# Patient Record
Sex: MALE | Race: WHITE
[De-identification: names, ages, dates, MRNs, and addresses within clinical notes are randomized per-mention and may not be internally consistent; named-entity substitution may affect disease eponyms.]

---

## 2016-11-03 VITALS
SYSTOLIC BLOOD PRESSURE: 204 MMHG | DIASTOLIC BLOOD PRESSURE: 96 MMHG | DIASTOLIC BLOOD PRESSURE: 96 MMHG | DIASTOLIC BLOOD PRESSURE: 96 MMHG | SYSTOLIC BLOOD PRESSURE: 204 MMHG | SYSTOLIC BLOOD PRESSURE: 204 MMHG | DIASTOLIC BLOOD PRESSURE: 96 MMHG | SYSTOLIC BLOOD PRESSURE: 204 MMHG

## 2017-06-06 ENCOUNTER — HOSPITAL ENCOUNTER (OUTPATIENT)
Dept: HOSPITAL 61 - KCIC CT | Age: 42
Discharge: HOME | End: 2017-06-06
Attending: NEUROLOGICAL SURGERY
Payer: MEDICARE

## 2017-06-06 DIAGNOSIS — R51: Primary | ICD-10-CM

## 2017-06-06 PROCEDURE — 70450 CT HEAD/BRAIN W/O DYE: CPT

## 2017-06-06 PROCEDURE — 74000: CPT

## 2017-06-06 PROCEDURE — 71020: CPT

## 2017-06-06 PROCEDURE — 70250 X-RAY EXAM OF SKULL: CPT

## 2017-06-06 NOTE — [facility]
AP and lateral skull

 

INDICATION: Headache, shunt series

 

FINDINGS:

 

There is a right transfrontal ventricular ostomy shunt catheter that 

terminates near midline. The extracranial portions demonstrate no evidence

for discontinuity or kinking. An old shunt catheter remnant is also noted 

in the soft tissues of the neck.

 

IMPRESSION:

 

No evidence for kinking or discontinuity of the ventriculostomy shunt 

catheter.

 

Electronically signed by: Scott Esqueda MD (6/6/2017 3:40 PM)

## 2017-06-06 NOTE — [facility]
ABDOMEN AP

 

Clinical Indication: Shunt series. Headache.

 

Comparison: None.

 

Findings: 

There is shunt catheter coursing in the right abdomen extending into the 

pelvis. Distal catheter is coiled on itself. There is a second catheter 

that is just to the right of midline and terminates in the upper abdomen 

projecting just lateral to L1/L2. No discontinuity of the catheters is 

seen.

 

No dilated loops of bowel are seen. The bowel gas pattern is 

nonobstructive. 

 

There is no acute bony abnormality.  

 

IMPRESSION:

Shunt catheters in the right abdomen and pelvis.

 

Electronically signed by: Baldev Seay MD (6/6/2017 3:43 PM)

## 2017-06-06 NOTE — [facility]
PQRS STATEMENT:

One or more of the following in the visualized dose reduction techniques 

were utilized for this study: 1. Automatic exposure control, 2. Adjustment

of the mA and/or kV according to patient size, 3. Use of iterative 

reconstruction technique 

 

CT HEAD

 

INDICATION: Hydrocephalus

 

COMPARISON: 4/20/2016 and 11/3/2015

 

TECHNIQUE: 5 mm contiguous axial images were obtained from the skull base 

to the vertex in both bone and soft tissue algorithm.  

 

FINDINGS: 

 

A right frontal ventricular catheter remains in stable positioning 

extending through the right frontal horn and foramen of Rai. The 

ventricles are unchanged in size and within normal limits. There are 

posterior changes of a a suboccipital craniectomy. There is no acute 

intracranial hemorrhage. No midline shift or mass effect. Basal cisterns 

are patent. Globes and orbits are within normal limits. Visualized 

paranasal sinuses and mastoid air cells are clear.

 

IMPRESSION:

 

No evidence for acute intracranial hemorrhage, mass effect, or 

hydrocephalus. Stable positioning of the right transforaminal 

ventriculostomy shunt catheter.

 

Electronically signed by: Scott Esqueda MD (6/6/2017 3:37 PM)

## 2017-06-06 NOTE — [facility]
Two view chest 

 

indication: Shunt series, headache

 

 

 

Findings:

 

There is a catheter overlying the right anterior chest wall shows no 

evidence for kinking or discontinuity. No shunt catheter is also noted 

which shows some surrounding calcification. Heart size is normal. Lungs 

are clear.

 

IMPRESSION:

 

No evidence for kinking or discontinuity of the ventriculostomy shunt 

catheter.

 

Electronically signed by: Scott Esqueda MD (6/6/2017 3:39 PM)

## 2017-08-22 ENCOUNTER — HOSPITAL ENCOUNTER (OUTPATIENT)
Dept: HOSPITAL 61 - KCIC CT | Age: 42
Discharge: HOME | End: 2017-08-22
Attending: NEUROLOGICAL SURGERY
Payer: MEDICARE

## 2017-08-22 DIAGNOSIS — G91.9: Primary | ICD-10-CM

## 2017-08-22 PROCEDURE — 70450 CT HEAD/BRAIN W/O DYE: CPT

## 2017-08-22 NOTE — [facility]
PQRS Compliance Statement:

 

One or more of the following individualized dose reduction techniques were

utilized for this examination:  

1. Automated exposure control  

2. Adjustment of the mA and/or kV according to patient size  

3. Use of iterative reconstruction technique

 

 

CT HEAD WITHOUT CONTRAST

 

History: Hydrocephalus follow-up. 

 

Comparison: CT head without contrast June 6, 2017.

 

Procedure: Axial images are obtained of the head from the skull base 

through the vertex without IV contrast.

 

Findings: 

Right frontal shunt catheter is stable. The ventricles are unchanged. 

 

No mass-effect, midline shift, hemorrhage, extra-axial fluid collection, 

or obvious acute infarction is identified.  Basilar cisterns are patent.  

 

Bone windows demonstrate no acute calvarial abnormality. Redemonstrated 

suboccipital craniectomy.

 

The visualized paranasal sinuses are clear. Mastoid air cells are well 

aerated. 

 

IMPRESSION:

1.  No acute intracranial abnormality. 

2.  Stable right frontal shunt catheter and ventricles.

 

Electronically signed by: Baldev Seay MD (8/22/2017 11:28 AM) [id number]

## 2018-03-22 ENCOUNTER — HOSPITAL ENCOUNTER (INPATIENT)
Dept: HOSPITAL 61 - 4 NORTH | Age: 43
LOS: 5 days | Discharge: HOME | DRG: 896 | End: 2018-03-27
Attending: FAMILY MEDICINE | Admitting: FAMILY MEDICINE
Payer: MEDICARE

## 2018-03-22 DIAGNOSIS — G93.41: ICD-10-CM

## 2018-03-22 DIAGNOSIS — Z86.011: ICD-10-CM

## 2018-03-22 DIAGNOSIS — E87.8: ICD-10-CM

## 2018-03-22 DIAGNOSIS — I25.10: ICD-10-CM
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
## 2018-03-22 DIAGNOSIS — Z83.3: ICD-10-CM
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
## 2018-03-22 DIAGNOSIS — N17.9: ICD-10-CM

## 2018-03-22 DIAGNOSIS — Z88.8: ICD-10-CM

## 2018-03-22 DIAGNOSIS — Z98.2: ICD-10-CM

## 2018-03-22 DIAGNOSIS — S09.90XA: ICD-10-CM

## 2018-03-22 DIAGNOSIS — F10.239: Primary | ICD-10-CM

## 2018-03-22 DIAGNOSIS — R56.9: ICD-10-CM

## 2018-03-22 DIAGNOSIS — Z88.0: ICD-10-CM

## 2018-03-22 DIAGNOSIS — I10: ICD-10-CM

## 2018-03-22 DIAGNOSIS — E87.1: ICD-10-CM
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
## 2018-03-22 DIAGNOSIS — E87.6: ICD-10-CM

## 2018-03-22 LAB
% ATYL: 2 % (ref 0–0)
% BANDS: 1 % (ref 0–9)
% LYMPHS: 16 % (ref 24–48)
% MONOS: 4 % (ref 0–10)
% SEGS: 77 % (ref 35–66)
ADD MAN DIFF?: YES
ALBUMIN SERPL-MCNC: 4.1 G/DL (ref 3.4–5)
ALBUMIN/GLOB SERPL: 1.1 {RATIO} (ref 1–1.7)
ALP SERPL-CCNC: 83 U/L (ref 46–116)
ALT (SGPT): 31 U/L (ref 16–63)
AMPHETAMINE/METHAMPHETAMINE: (no result)
ANION GAP SERPL CALC-SCNC: 20 MMOL/L (ref 6–14)
AST SERPL-CCNC: 24 U/L (ref 15–37)
BACTERIA,URINE: (no result) /HPF
BARBITURATES: (no result)
BASO #: 0.1 X10^3/UL (ref 0–0.2)
BASO %: 1 % (ref 0–3)
BENZODIAZEPINES: (no result)
BILIRUBIN,URINE: NEGATIVE
BLOOD UREA NITROGEN: 14 MG/DL (ref 8–26)
BUN/CREAT SERPL: 6 (ref 6–20)
CALCIUM: 9.4 MG/DL (ref 8.5–10.1)
CANNABINOIDS: (no result)
CHLORIDE: 76 MMOL/L (ref 98–107)
CLARITY,URINE: CLEAR
CO2 SERPL-SCNC: 22 MMOL/L (ref 21–32)
COCAINE: (no result)
COLOR,URINE: YELLOW
CREAT SERPL-MCNC: 2.2 MG/DL (ref 0.7–1.3)
EOS #: 0 X10^3/UL (ref 0–0.7)
EOS %: 0 % (ref 0–3)
ETHANOL, URINE: (no result)
ETHANOL: < 10 MG/DL (ref 0–10)
GFR SERPLBLD BASED ON 1.73 SQ M-ARVRAT: 32.8 ML/MIN
GLOBULIN SER-MCNC: 3.7 G/DL (ref 2.2–3.8)
GLUCOSE SERPL-MCNC: 56 MG/DL (ref 70–99)
GLUCOSE,URINE: NEGATIVE MG/DL
HCG SERPL-ACNC: 19.5 X10^3/UL (ref 4–11)
HEMATOCRIT: 36.6 % (ref 39–53)
HEMOGLOBIN: 12.4 G/DL (ref 13–17.5)
HYALINE CASTS, URINE: (no result) /HPF
INR: 1.2 (ref 0.8–1.1)
LYMPH #: 3.3 X10^3/UL (ref 1–4.8)
LYMPH %: 17 % (ref 24–48)
MAGNESIUM: 2.2 MG/DL (ref 1.8–2.4)
MEAN CORPUSCULAR HEMOGLOBIN: 31 PG (ref 25–35)
MEAN CORPUSCULAR HGB CONC: 34 G/DL (ref 31–37)
MEAN CORPUSCULAR VOLUME: 93 FL (ref 79–100)
METHADONE: (no result)
MONO #: 1.9 X10^3/UL (ref 0–1.1)
MONO %: 10 % (ref 0–9)
NEUT #: 14.2 X10^3UL (ref 1.8–7.7)
NEUT %: 73 % (ref 31–73)
NITRITE,URINE: NEGATIVE
NT-PRO BNP: 1005 PG/ML (ref 0–124)
OPIATES: (no result)
PARTIAL THROMBOPLASTIN TIME: 26 SEC (ref 24–38)
PH,URINE: 6
PHENCYCLIDINE: (no result)
PLATELET COUNT: 748 X10^3/UL (ref 140–400)
PLT ESTIMATE: (no result)
POTASSIUM SERPL-SCNC: 2.5 MMOL/L (ref 3.5–5.1)
PROTEIN,URINE: NEGATIVE MG/DL
PROTHROMBIN TIME PATIENT: 14.4 SEC (ref 11.7–14)
RBC,URINE: 0 /HPF (ref 0–2)
RED BLOOD COUNT: 3.93 X10^6/UL (ref 4.3–5.7)
RED CELL DISTRIBUTION WIDTH: 14.4 % (ref 11.5–14.5)
SODIUM: 118 MMOL/L (ref 136–145)
SPECIFIC GRAVITY,URINE: <=1.005
SQUAMOUS EPITHELIAL CELL,UR: (no result) /LPF
TOTAL BILIRUBIN: 0.3 MG/DL (ref 0.2–1)
TOTAL PROTEIN: 7.8 G/DL (ref 6.4–8.2)
TROPONINI: < 0.017 NG/ML (ref 0–0.06)
UROBILINOGEN,URINE: 0.2 MG/DL
WBC,URINE: (no result) /HPF (ref 0–4)

## 2018-03-22 PROCEDURE — 84300 ASSAY OF URINE SODIUM: CPT

## 2018-03-22 PROCEDURE — 70450 CT HEAD/BRAIN W/O DYE: CPT

## 2018-03-22 PROCEDURE — 97110 THERAPEUTIC EXERCISES: CPT

## 2018-03-22 PROCEDURE — 96376 TX/PRO/DX INJ SAME DRUG ADON: CPT

## 2018-03-22 PROCEDURE — 74018 RADEX ABDOMEN 1 VIEW: CPT

## 2018-03-22 PROCEDURE — 80048 BASIC METABOLIC PNL TOTAL CA: CPT

## 2018-03-22 PROCEDURE — 36415 COLL VENOUS BLD VENIPUNCTURE: CPT

## 2018-03-22 PROCEDURE — 85007 BL SMEAR W/DIFF WBC COUNT: CPT

## 2018-03-22 PROCEDURE — 83880 ASSAY OF NATRIURETIC PEPTIDE: CPT

## 2018-03-22 PROCEDURE — 84295 ASSAY OF SERUM SODIUM: CPT

## 2018-03-22 PROCEDURE — 97165 OT EVAL LOW COMPLEX 30 MIN: CPT

## 2018-03-22 PROCEDURE — 81001 URINALYSIS AUTO W/SCOPE: CPT

## 2018-03-22 PROCEDURE — 97162 PT EVAL MOD COMPLEX 30 MIN: CPT

## 2018-03-22 PROCEDURE — 85730 THROMBOPLASTIN TIME PARTIAL: CPT

## 2018-03-22 PROCEDURE — 87641 MR-STAPH DNA AMP PROBE: CPT

## 2018-03-22 PROCEDURE — 84443 ASSAY THYROID STIM HORMONE: CPT

## 2018-03-22 PROCEDURE — 84484 ASSAY OF TROPONIN QUANT: CPT

## 2018-03-22 PROCEDURE — 80053 COMPREHEN METABOLIC PANEL: CPT

## 2018-03-22 PROCEDURE — 84550 ASSAY OF BLOOD/URIC ACID: CPT

## 2018-03-22 PROCEDURE — 80307 DRUG TEST PRSMV CHEM ANLYZR: CPT

## 2018-03-22 PROCEDURE — 93005 ELECTROCARDIOGRAM TRACING: CPT

## 2018-03-22 PROCEDURE — 85025 COMPLETE CBC W/AUTO DIFF WBC: CPT

## 2018-03-22 PROCEDURE — 82962 GLUCOSE BLOOD TEST: CPT

## 2018-03-22 PROCEDURE — 97530 THERAPEUTIC ACTIVITIES: CPT

## 2018-03-22 PROCEDURE — 84132 ASSAY OF SERUM POTASSIUM: CPT

## 2018-03-22 PROCEDURE — 97116 GAIT TRAINING THERAPY: CPT

## 2018-03-22 PROCEDURE — 51702 INSERT TEMP BLADDER CATH: CPT

## 2018-03-22 PROCEDURE — 83735 ASSAY OF MAGNESIUM: CPT

## 2018-03-22 PROCEDURE — 72125 CT NECK SPINE W/O DYE: CPT

## 2018-03-22 PROCEDURE — 71045 X-RAY EXAM CHEST 1 VIEW: CPT

## 2018-03-22 PROCEDURE — 80069 RENAL FUNCTION PANEL: CPT

## 2018-03-22 PROCEDURE — 95816 EEG AWAKE AND DROWSY: CPT

## 2018-03-22 PROCEDURE — 82550 ASSAY OF CK (CPK): CPT

## 2018-03-22 PROCEDURE — 96375 TX/PRO/DX INJ NEW DRUG ADDON: CPT

## 2018-03-22 PROCEDURE — 87086 URINE CULTURE/COLONY COUNT: CPT

## 2018-03-22 PROCEDURE — 96365 THER/PROPH/DIAG IV INF INIT: CPT

## 2018-03-22 PROCEDURE — 99291 CRITICAL CARE FIRST HOUR: CPT

## 2018-03-22 PROCEDURE — 85610 PROTHROMBIN TIME: CPT

## 2018-03-22 PROCEDURE — 96361 HYDRATE IV INFUSION ADD-ON: CPT

## 2018-03-22 RX ADMIN — BACITRACIN 1 MLS/HR: 5000 INJECTION, POWDER, FOR SOLUTION INTRAMUSCULAR at 18:45

## 2018-03-22 RX ADMIN — HALOPERIDOL LACTATE 1 MG: 5 INJECTION, SOLUTION INTRAMUSCULAR at 18:41

## 2018-03-22 RX ADMIN — POTASSIUM CHLORIDE 1 MLS/HR: 2 INJECTION, SOLUTION, CONCENTRATE INTRAVENOUS at 20:27

## 2018-03-22 RX ADMIN — MIDAZOLAM HYDROCHLORIDE 1 MG: 1 INJECTION, SOLUTION INTRAMUSCULAR; INTRAVENOUS at 18:45

## 2018-03-23 LAB
ADD MAN DIFF?: NO
ANION GAP SERPL CALC-SCNC: 10 MMOL/L (ref 6–14)
ANION GAP SERPL CALC-SCNC: 11 MMOL/L (ref 6–14)
BASO #: 0 X10^3/UL (ref 0–0.2)
BASO %: 0 % (ref 0–3)
BLOOD UREA NITROGEN: 11 MG/DL (ref 8–26)
BLOOD UREA NITROGEN: 7 MG/DL (ref 8–26)
CALCIUM: 8.4 MG/DL (ref 8.5–10.1)
CALCIUM: 9 MG/DL (ref 8.5–10.1)
CHLORIDE: 84 MMOL/L (ref 98–107)
CHLORIDE: 94 MMOL/L (ref 98–107)
CO2 SERPL-SCNC: 25 MMOL/L (ref 21–32)
CO2 SERPL-SCNC: 29 MMOL/L (ref 21–32)
CREAT SERPL-MCNC: 1.3 MG/DL (ref 0.7–1.3)
CREAT SERPL-MCNC: 1.4 MG/DL (ref 0.7–1.3)
EOS #: 0 X10^3/UL (ref 0–0.7)
EOS %: 0 % (ref 0–3)
ETHANOL: < 10 MG/DL (ref 0–10)
GFR SERPLBLD BASED ON 1.73 SQ M-ARVRAT: 55.3 ML/MIN
GFR SERPLBLD BASED ON 1.73 SQ M-ARVRAT: 60.2 ML/MIN
GLUCOSE SERPL-MCNC: 105 MG/DL (ref 70–99)
GLUCOSE SERPL-MCNC: 199 MG/DL (ref 70–99)
HCG SERPL-ACNC: 10.9 X10^3/UL (ref 4–11)
HEMATOCRIT: 32.8 % (ref 39–53)
HEMOGLOBIN: 11.6 G/DL (ref 13–17.5)
LYMPH #: 2 X10^3/UL (ref 1–4.8)
LYMPH %: 18 % (ref 24–48)
MEAN CORPUSCULAR HEMOGLOBIN: 31 PG (ref 25–35)
MEAN CORPUSCULAR HGB CONC: 35 G/DL (ref 31–37)
MEAN CORPUSCULAR VOLUME: 89 FL (ref 79–100)
MONO #: 1.1 X10^3/UL (ref 0–1.1)
MONO %: 10 % (ref 0–9)
NEUT #: 7.7 X10^3UL (ref 1.8–7.7)
NEUT %: 71 % (ref 31–73)
PLATELET COUNT: 590 X10^3/UL (ref 140–400)
POC GLUCOSE: 128 MG/DL (ref 70–99)
POTASSIUM SERPL-SCNC: 2.8 MMOL/L (ref 3.5–5.1)
POTASSIUM SERPL-SCNC: 3.9 MMOL/L (ref 3.5–5.1)
RED BLOOD COUNT: 3.68 X10^6/UL (ref 4.3–5.7)
RED CELL DISTRIBUTION WIDTH: 14.3 % (ref 11.5–14.5)
SODIUM: 124 MMOL/L (ref 136–145)
SODIUM: 129 MMOL/L (ref 136–145)
THYROID STIM HORMONE (TSH): 3.16 UIU/ML (ref 0.36–3.74)
URIC ACID: 3.1 MG/DL (ref 3.5–7.2)

## 2018-03-23 RX ADMIN — MULTIPLE VITAMINS W/ MINERALS TAB 1 TAB: TAB at 08:47

## 2018-03-23 RX ADMIN — POTASSIUM CHLORIDE 1 MLS/HR: 2 INJECTION, SOLUTION, CONCENTRATE INTRAVENOUS at 11:44

## 2018-03-23 RX ADMIN — FOLIC ACID 1 MG: 1 TABLET ORAL at 08:47

## 2018-03-23 RX ADMIN — POTASSIUM CHLORIDE 1 MLS/HR: 2 INJECTION, SOLUTION, CONCENTRATE INTRAVENOUS at 04:52

## 2018-03-23 RX ADMIN — SODIUM CHLORIDE AND POTASSIUM CHLORIDE 1 MLS/HR: 4.5; 1.49 INJECTION, SOLUTION INTRAVENOUS at 13:02

## 2018-03-23 RX ADMIN — SODIUM CHLORIDE AND POTASSIUM CHLORIDE 1 MLS/HR: 4.5; 1.49 INJECTION, SOLUTION INTRAVENOUS at 22:23

## 2018-03-23 RX ADMIN — Medication 1 MG: at 08:47

## 2018-03-24 LAB
ADD MAN DIFF?: NO
ALBUMIN SERPL-MCNC: 3.5 G/DL (ref 3.4–5)
ANION GAP SERPL CALC-SCNC: 10 MMOL/L (ref 6–14)
BASO #: 0.1 X10^3/UL (ref 0–0.2)
BASO %: 1 % (ref 0–3)
BLOOD UREA NITROGEN: 3 MG/DL (ref 8–26)
CALCIUM: 8.5 MG/DL (ref 8.5–10.1)
CHLORIDE: 90 MMOL/L (ref 98–107)
CK SERPL-CCNC: 165 U/L (ref 39–308)
CO2 SERPL-SCNC: 26 MMOL/L (ref 21–32)
CREAT SERPL-MCNC: 0.9 MG/DL (ref 0.7–1.3)
EOS #: 0 X10^3/UL (ref 0–0.7)
EOS %: 0 % (ref 0–3)
GFR SERPLBLD BASED ON 1.73 SQ M-ARVRAT: 92.1 ML/MIN
GLUCOSE SERPL-MCNC: 92 MG/DL (ref 70–99)
HCG SERPL-ACNC: 8.6 X10^3/UL (ref 4–11)
HEMATOCRIT: 29.8 % (ref 39–53)
HEMOGLOBIN: 10.5 G/DL (ref 13–17.5)
LYMPH #: 2.3 X10^3/UL (ref 1–4.8)
LYMPH %: 26 % (ref 24–48)
MAGNESIUM: 1.8 MG/DL (ref 1.8–2.4)
MAGNESIUM: 1.9 MG/DL (ref 1.8–2.4)
MEAN CORPUSCULAR HEMOGLOBIN: 32 PG (ref 25–35)
MEAN CORPUSCULAR HGB CONC: 35 G/DL (ref 31–37)
MEAN CORPUSCULAR VOLUME: 90 FL (ref 79–100)
MONO #: 0.8 X10^3/UL (ref 0–1.1)
MONO %: 10 % (ref 0–9)
NEUT #: 5.3 X10^3UL (ref 1.8–7.7)
NEUT %: 62 % (ref 31–73)
PHOSPHORUS: 2.5 MG/DL (ref 2.6–4.7)
PLATELET COUNT: 537 X10^3/UL (ref 140–400)
POTASSIUM SERPL-SCNC: 3.2 MMOL/L (ref 3.5–5.1)
POTASSIUM SERPL-SCNC: 3.3 MMOL/L (ref 3.5–5.1)
POTASSIUM SERPL-SCNC: 3.5 MMOL/L (ref 3.5–5.1)
RED BLOOD COUNT: 3.32 X10^6/UL (ref 4.3–5.7)
RED CELL DISTRIBUTION WIDTH: 14 % (ref 11.5–14.5)
SODIUM: 124 MMOL/L (ref 136–145)
SODIUM: 125 MMOL/L (ref 136–145)
SODIUM: 126 MMOL/L (ref 136–145)

## 2018-03-24 RX ADMIN — BACITRACIN 1 MLS/HR: 5000 INJECTION, POWDER, FOR SOLUTION INTRAMUSCULAR at 13:23

## 2018-03-24 RX ADMIN — SODIUM CHLORIDE AND POTASSIUM CHLORIDE 1 MLS/HR: 4.5; 1.49 INJECTION, SOLUTION INTRAVENOUS at 08:58

## 2018-03-24 RX ADMIN — HALOPERIDOL LACTATE 1 MG: 5 INJECTION, SOLUTION INTRAMUSCULAR at 14:19

## 2018-03-24 RX ADMIN — FOLIC ACID 1 MG: 1 TABLET ORAL at 08:57

## 2018-03-24 RX ADMIN — Medication 1 MG: at 08:56

## 2018-03-24 RX ADMIN — MULTIPLE VITAMINS W/ MINERALS TAB 1 TAB: TAB at 08:57

## 2018-03-25 LAB
ADD MAN DIFF?: NO
ALBUMIN SERPL-MCNC: 3.6 G/DL (ref 3.4–5)
ANION GAP SERPL CALC-SCNC: 11 MMOL/L (ref 6–14)
BASO #: 0.1 X10^3/UL (ref 0–0.2)
BASO %: 1 % (ref 0–3)
BLOOD UREA NITROGEN: 3 MG/DL (ref 8–26)
CALCIUM: 8.7 MG/DL (ref 8.5–10.1)
CHLORIDE: 94 MMOL/L (ref 98–107)
CK SERPL-CCNC: 480 U/L (ref 39–308)
CO2 SERPL-SCNC: 26 MMOL/L (ref 21–32)
CREAT SERPL-MCNC: 0.9 MG/DL (ref 0.7–1.3)
EOS #: 0.1 X10^3/UL (ref 0–0.7)
EOS %: 1 % (ref 0–3)
GFR SERPLBLD BASED ON 1.73 SQ M-ARVRAT: 92.1 ML/MIN
GLUCOSE SERPL-MCNC: 121 MG/DL (ref 70–99)
HCG SERPL-ACNC: 8.1 X10^3/UL (ref 4–11)
HEMATOCRIT: 30.3 % (ref 39–53)
HEMOGLOBIN: 10.6 G/DL (ref 13–17.5)
LYMPH #: 1.4 X10^3/UL (ref 1–4.8)
LYMPH %: 17 % (ref 24–48)
MAGNESIUM: 1.9 MG/DL (ref 1.8–2.4)
MEAN CORPUSCULAR HEMOGLOBIN: 32 PG (ref 25–35)
MEAN CORPUSCULAR HGB CONC: 35 G/DL (ref 31–37)
MEAN CORPUSCULAR VOLUME: 91 FL (ref 79–100)
MONO #: 0.8 X10^3/UL (ref 0–1.1)
MONO %: 11 % (ref 0–9)
NEUT #: 5.7 X10^3UL (ref 1.8–7.7)
NEUT %: 71 % (ref 31–73)
PHOSPHORUS: 2.6 MG/DL (ref 2.6–4.7)
PLATELET COUNT: 499 X10^3/UL (ref 140–400)
POTASSIUM SERPL-SCNC: 3.1 MMOL/L (ref 3.5–5.1)
POTASSIUM SERPL-SCNC: 3.3 MMOL/L (ref 3.5–5.1)
RED BLOOD COUNT: 3.31 X10^6/UL (ref 4.3–5.7)
RED CELL DISTRIBUTION WIDTH: 14.3 % (ref 11.5–14.5)
SODIUM: 131 MMOL/L (ref 136–145)

## 2018-03-25 RX ADMIN — BACITRACIN 1 MLS/HR: 5000 INJECTION, POWDER, FOR SOLUTION INTRAMUSCULAR at 06:20

## 2018-03-25 RX ADMIN — FOLIC ACID 1 MG: 1 TABLET ORAL at 09:00

## 2018-03-25 RX ADMIN — MULTIPLE VITAMINS W/ MINERALS TAB 1 TAB: TAB at 09:00

## 2018-03-25 RX ADMIN — ACETAMINOPHEN 1 MG: 325 TABLET, FILM COATED ORAL at 22:54

## 2018-03-25 RX ADMIN — Medication 1 MG: at 09:00

## 2018-03-26 LAB
ADD MAN DIFF?: NO
ALBUMIN SERPL-MCNC: 3.6 G/DL (ref 3.4–5)
ANION GAP SERPL CALC-SCNC: 12 MMOL/L (ref 6–14)
BASO #: 0.1 X10^3/UL (ref 0–0.2)
BASO %: 1 % (ref 0–3)
BLOOD UREA NITROGEN: 2 MG/DL (ref 8–26)
CALCIUM: 8.6 MG/DL (ref 8.5–10.1)
CHLORIDE: 96 MMOL/L (ref 98–107)
CK SERPL-CCNC: 768 U/L (ref 39–308)
CO2 SERPL-SCNC: 25 MMOL/L (ref 21–32)
CREAT SERPL-MCNC: 0.9 MG/DL (ref 0.7–1.3)
EOS #: 0.1 X10^3/UL (ref 0–0.7)
EOS %: 1 % (ref 0–3)
GFR SERPLBLD BASED ON 1.73 SQ M-ARVRAT: 92.1 ML/MIN
GLUCOSE SERPL-MCNC: 107 MG/DL (ref 70–99)
HCG SERPL-ACNC: 7.8 X10^3/UL (ref 4–11)
HEMATOCRIT: 30.9 % (ref 39–53)
HEMOGLOBIN: 10.5 G/DL (ref 13–17.5)
LYMPH #: 2 X10^3/UL (ref 1–4.8)
LYMPH %: 26 % (ref 24–48)
MAGNESIUM: 1.7 MG/DL (ref 1.8–2.4)
MEAN CORPUSCULAR HEMOGLOBIN: 31 PG (ref 25–35)
MEAN CORPUSCULAR HGB CONC: 34 G/DL (ref 31–37)
MEAN CORPUSCULAR VOLUME: 91 FL (ref 79–100)
MONO #: 0.8 X10^3/UL (ref 0–1.1)
MONO %: 10 % (ref 0–9)
NEUT #: 4.8 X10^3UL (ref 1.8–7.7)
NEUT %: 61 % (ref 31–73)
PHOSPHORUS: 3.2 MG/DL (ref 2.6–4.7)
PLATELET COUNT: 533 X10^3/UL (ref 140–400)
POTASSIUM SERPL-SCNC: 3.1 MMOL/L (ref 3.5–5.1)
RED BLOOD COUNT: 3.38 X10^6/UL (ref 4.3–5.7)
RED CELL DISTRIBUTION WIDTH: 14.6 % (ref 11.5–14.5)
SODIUM: 133 MMOL/L (ref 136–145)

## 2018-03-26 RX ADMIN — HALOPERIDOL LACTATE 1 MG: 5 INJECTION, SOLUTION INTRAMUSCULAR at 18:05

## 2018-03-26 RX ADMIN — HALOPERIDOL LACTATE 1 MG: 5 INJECTION, SOLUTION INTRAMUSCULAR at 13:57

## 2018-03-26 RX ADMIN — ACETAMINOPHEN 1 MG: 325 TABLET, FILM COATED ORAL at 08:34

## 2018-03-26 RX ADMIN — MULTIPLE VITAMINS W/ MINERALS TAB 1 TAB: TAB at 08:34

## 2018-03-26 RX ADMIN — HALOPERIDOL LACTATE 1 MG: 5 INJECTION, SOLUTION INTRAMUSCULAR at 01:26

## 2018-03-26 RX ADMIN — HALOPERIDOL LACTATE 1 MG: 5 INJECTION, SOLUTION INTRAMUSCULAR at 08:36

## 2018-03-26 RX ADMIN — ACETAMINOPHEN 1 MG: 325 TABLET, FILM COATED ORAL at 14:02

## 2018-03-26 RX ADMIN — FOLIC ACID 1 MG: 1 TABLET ORAL at 08:34

## 2018-03-26 RX ADMIN — Medication 1 MG: at 08:34

## 2018-03-27 LAB
ADD MAN DIFF?: NO
ALBUMIN SERPL-MCNC: 3.7 G/DL (ref 3.4–5)
ALBUMIN SERPL-MCNC: 3.8 G/DL (ref 3.4–5)
ALBUMIN/GLOB SERPL: 1.1 {RATIO} (ref 1–1.7)
ALP SERPL-CCNC: 88 U/L (ref 46–116)
ALT (SGPT): 69 U/L (ref 16–63)
ANION GAP SERPL CALC-SCNC: 11 MMOL/L (ref 6–14)
ANION GAP SERPL CALC-SCNC: 11 MMOL/L (ref 6–14)
AST SERPL-CCNC: 40 U/L (ref 15–37)
BASO #: 0.1 X10^3/UL (ref 0–0.2)
BASO %: 1 % (ref 0–3)
BLOOD UREA NITROGEN: 3 MG/DL (ref 8–26)
BLOOD UREA NITROGEN: 4 MG/DL (ref 8–26)
BUN/CREAT SERPL: 4 (ref 6–20)
CALCIUM: 8.8 MG/DL (ref 8.5–10.1)
CALCIUM: 8.9 MG/DL (ref 8.5–10.1)
CHLORIDE: 97 MMOL/L (ref 98–107)
CHLORIDE: 98 MMOL/L (ref 98–107)
CK SERPL-CCNC: 586 U/L (ref 39–308)
CO2 SERPL-SCNC: 24 MMOL/L (ref 21–32)
CO2 SERPL-SCNC: 26 MMOL/L (ref 21–32)
CREAT SERPL-MCNC: 0.9 MG/DL (ref 0.7–1.3)
CREAT SERPL-MCNC: 0.9 MG/DL (ref 0.7–1.3)
EOS #: 0.1 X10^3/UL (ref 0–0.7)
EOS %: 1 % (ref 0–3)
GFR SERPLBLD BASED ON 1.73 SQ M-ARVRAT: 92.1 ML/MIN
GFR SERPLBLD BASED ON 1.73 SQ M-ARVRAT: 92.1 ML/MIN
GLOBULIN SER-MCNC: 3.5 G/DL (ref 2.2–3.8)
GLUCOSE SERPL-MCNC: 111 MG/DL (ref 70–99)
GLUCOSE SERPL-MCNC: 113 MG/DL (ref 70–99)
HCG SERPL-ACNC: 9.1 X10^3/UL (ref 4–11)
HEMATOCRIT: 30.5 % (ref 39–53)
HEMOGLOBIN: 10.9 G/DL (ref 13–17.5)
LYMPH #: 2.1 X10^3/UL (ref 1–4.8)
LYMPH %: 23 % (ref 24–48)
MAGNESIUM: 1.7 MG/DL (ref 1.8–2.4)
MEAN CORPUSCULAR HEMOGLOBIN: 32 PG (ref 25–35)
MEAN CORPUSCULAR HGB CONC: 36 G/DL (ref 31–37)
MEAN CORPUSCULAR VOLUME: 91 FL (ref 79–100)
MONO #: 0.7 X10^3/UL (ref 0–1.1)
MONO %: 8 % (ref 0–9)
NEUT #: 6.1 X10^3UL (ref 1.8–7.7)
NEUT %: 67 % (ref 31–73)
PHOSPHORUS: 3.6 MG/DL (ref 2.6–4.7)
PLATELET COUNT: 559 X10^3/UL (ref 140–400)
POTASSIUM SERPL-SCNC: 2.9 MMOL/L (ref 3.5–5.1)
POTASSIUM SERPL-SCNC: 3.9 MMOL/L (ref 3.5–5.1)
RED BLOOD COUNT: 3.36 X10^6/UL (ref 4.3–5.7)
RED CELL DISTRIBUTION WIDTH: 14.5 % (ref 11.5–14.5)
SODIUM: 132 MMOL/L (ref 136–145)
SODIUM: 135 MMOL/L (ref 136–145)
TOTAL BILIRUBIN: 0.3 MG/DL (ref 0.2–1)
TOTAL PROTEIN: 7.3 G/DL (ref 6.4–8.2)

## 2018-03-27 RX ADMIN — MULTIPLE VITAMINS W/ MINERALS TAB 1 TAB: TAB at 08:03

## 2018-03-27 RX ADMIN — POTASSIUM CHLORIDE 1 MLS/HR: 2 INJECTION, SOLUTION, CONCENTRATE INTRAVENOUS at 06:17

## 2018-03-27 RX ADMIN — ACETAMINOPHEN 1 MG: 325 TABLET, FILM COATED ORAL at 08:03

## 2018-03-27 RX ADMIN — ACETAMINOPHEN 1 MG: 325 TABLET, FILM COATED ORAL at 15:40

## 2018-03-27 RX ADMIN — FOLIC ACID 1 MG: 1 TABLET ORAL at 08:03

## 2018-03-27 RX ADMIN — Medication 1 MG: at 08:03

## 2018-03-27 RX ADMIN — POTASSIUM CHLORIDE 1 MEQ: 1500 TABLET, EXTENDED RELEASE ORAL at 14:28

## 2018-12-17 ENCOUNTER — HOSPITAL ENCOUNTER (OUTPATIENT)
Dept: HOSPITAL 61 - RT | Age: 43
Discharge: HOME | End: 2018-12-17
Attending: PSYCHIATRY & NEUROLOGY
Payer: MEDICARE

## 2018-12-17 DIAGNOSIS — R56.9: Primary | ICD-10-CM

## 2018-12-17 PROCEDURE — 95816 EEG AWAKE AND DROWSY: CPT

## 2018-12-19 NOTE — EEG
DATE OF SERVICE:  12/17/2018



EEG NUMBER:  [id number]



OBJECTIVE:  This is a 43-year-old male patient with history of seizure.  EEG

was requested to evaluate seizure activity.



METHODS:  Twenty electrodes were applied according to the international 10-20

electrode placement system.  EKG monitoring, hyperventilation, intermittent

photic stimulation, monopolar and bipolar montages are routinely utilized.  The

record was obtained on a digital system with video monitoring.



FINDINGS:



1.  Background:  The patient was recorded in the awake and drowsy states.  No

actual sleep state was recorded.  The overall background amplitude is 10-20

microvolts.  A posterior dominant rhythm of 8 Hz is observed.



2.  Abnormalities:  No specific epileptiform discharge or electrographic seizure

is seen.  No focal or diffuse slowing.



3.  Activation:  Hyperventilation was performed with fair efforts and normal

response.  Intermittent photic stimulation was performed with photic driving. 

No specific epileptiform discharge or electrographic seizure induced by

hyperventilation or intermittent photic stimulation.



IMPRESSION:  This EEG is a normal study for the awake and drowsy states.  No

actual sleep state was recorded.  No focal, lateralizing, specific epileptiform

discharge or electrographic seizure is seen.

 



______________________________

MARCO MIR MD



DR:  [email or web address]/kamille  JOB#:  [id number] / [id number]

DD:  12/19/2018 13:10  DT:  12/19/2018 13:26

JOSH

## 2019-06-24 ENCOUNTER — HOSPITAL ENCOUNTER (OUTPATIENT)
Dept: HOSPITAL 61 - LAB | Age: 44
Discharge: HOME | End: 2019-06-24
Attending: PSYCHIATRY & NEUROLOGY
Payer: MEDICARE

## 2019-06-24 DIAGNOSIS — Z79.899: ICD-10-CM

## 2019-06-24 DIAGNOSIS — R56.9: Primary | ICD-10-CM

## 2019-06-24 PROCEDURE — 82607 VITAMIN B-12: CPT

## 2019-06-24 PROCEDURE — 84443 ASSAY THYROID STIM HORMONE: CPT

## 2019-06-24 PROCEDURE — 36415 COLL VENOUS BLD VENIPUNCTURE: CPT
